# Patient Record
Sex: MALE | Race: WHITE
[De-identification: names, ages, dates, MRNs, and addresses within clinical notes are randomized per-mention and may not be internally consistent; named-entity substitution may affect disease eponyms.]

---

## 2017-01-09 NOTE — ER DOCUMENT REPORT
ED Medical Screen (RME)





- General


Chief Complaint: Flank Pain


Stated Complaint: ABDOMINAL PAIN


Time seen by provider: 21:14


Mode of Arrival: Ambulatory


Information source: Patient


Notes: 


26-year-old male complaining of right flank pain for 2 weeks that is radiating 

around to the right lower quadrant into the right testicle today at 3 PM.  No 

nausea.  History of a kidney stone.  feels like a kidney stone in the front.  

He ate dinner at 5:30 PM.

## 2017-01-09 NOTE — ER DOCUMENT REPORT
ED General





- General


Chief Complaint: Flank Pain


Stated Complaint: ABDOMINAL PAIN


Mode of Arrival: Ambulatory


Notes: 


Patient is a 26-year-old male presents with complaint of pain in his right back 

that radiates around the right flank and into the right lower abdomen.  He also 

feels some pain that shoots into his testicle.  No pain with movement or 

palpation of the testicles.  No pain or burning with urination.  Patient's had 

kidney stones in the past and says this feels very similar to previous kidney 

stones.  No vomiting.  No previous intervention to remove kidney stones.  Pain 

is actually been there for 2 weeks.  He says at work he does do a lot of heavy 

lifting and twisting.  He takes no medications.  He is otherwise healthy.  He 

does smoke.  Occasionally drinks alcohol.  No history of drug abuse.


TRAVEL OUTSIDE OF THE [place] IN LAST 30 DAYS: No





- Related Data


Allergies/Adverse Reactions: 


 





No Known Allergies Allergy (Unverified 01/09/17 23:07)


 











Past Medical History





- General


Information source: Patient





- Social History


Smoking Status: Current Every Day Smoker


Chew tobacco use (# tins/day): No


Frequency of alcohol use: Occasional


Drug Abuse: None


Family History: Reviewed & Not Pertinent


Patient has suicidal ideation: No


Patient has homicidal ideation: No


Renal/ Medical History: Reports: Hx Kidney Stones





- Immunizations


Hx Diphtheria, Pertussis, Tetanus Vaccination: Yes





Review of Systems





- Review of Systems


Notes: 


My Normal Review Basic





REVIEW OF SYSTEMS:


CONSTITUTIONAL :  Denies fever,  chills, or sweats.  Denies recent illness.


RESPIRATORY:  Denies cough, cold, or chest congestion.  Denies shortness of 

breath, difficulty breathing, or wheezing.


GASTROINTESTINAL: Right lower abdominal pain.  Denies nausea, vomiting, or 

diarrhea.  Denies constipation.  Last BM: 


GENITOURINARY:  Denies difficulty urinating, painful urination, burning, 

frequency, or blood in urine.


MUSCULOSKELETAL: Right-sided back pain


SKIN:   Denies rash or skin lesions.


NEUROLOGICAL:  Denies altered mental status or loss of consciousness.  Denies 

headache.  Denies weakness or paralysis or loss of use of either side.  Denies 

problems with gait or speech.  Denies sensory or motor loss.


ALL OTHER SYSTEMS REVIEWED AND NEGATIVE.





Physical Exam





- Vital signs


Vitals: 


 











Temp Pulse Resp BP Pulse Ox


 


 98.2 F   74   18   136/84 H  97 


 


 01/09/17 21:13  01/09/17 21:13  01/09/17 21:13  01/09/17 21:13  01/09/17 21:13














- Notes


Notes: 


General Appearance: Well nourished, alert, cooperative, no acute distress, mild 

obvious discomfort.


Vitals: reviewed, See vital signs table.


Head: no swelling or tenderness to the head


Eyes: PERRL, EOMI, Conjuctiva clear


Mouth: No decreasd moisturey


Neck: Supple, no neck tenderness, No thyromegaly


Lungs: No wheezing, No rales, No rhonci, No accessory muscle use, good air 

exchange bilaterally.


Heart: Normal rate, Regular rythm, No murmur, no rub


Abdomen: Normal BS, soft, No rigidity, mild to moderate right flank and right-

sided abdominal tenderness to palpation, No guarding, no rebound, no abdominal 

masses, no organomegaly


Genital: No swelling or redness to the testicles are genitalia.  No pain to 

palpation of the testicles.  No evidence of inguinal hernia on exam.


Back: Moderate pain palpation of the right lumbar paraspinal musculature.


Extremities: strength 5/5 in all extremities, good pulses in all extremities, 

no swelling or tenderness in the extremities, no edema.


Skin: warm, dry, appropriate color, no rash


Neuro: speech clear, oriented x 3, normal affect, responds appropriately to 

questions.





Course





- Vital Signs


Vital signs: 


 











Temp Pulse Resp BP Pulse Ox


 


 98.2 F   74   18   156/84 H  97 


 


 01/09/17 22:28  01/09/17 22:28  01/09/17 22:28  01/09/17 22:28  01/09/17 22:28














- Laboratory


Result Diagrams: 


 01/09/17 21:21





 01/09/17 21:21


Laboratory results interpreted by me: 


 











  01/09/17 01/09/17





  21:21 21:21


 


Carbon Dioxide  31 H 


 


Creatinine  1.28 H 


 


Calcium  10.4 H 


 


Urine Protein   30 H


 


Urine Ketones   TRACE H


 


Urine Ascorbic Acid   40 H














- Transfer of Care


Notes: 


01/10/17 00:05


I did recommend a CT scan to a kidney stone being that the patient's says 

painful symmetric kidney stones also has some atypical features in that it is 

reproducible palpation.  Patient initially agreed but then later refused CT 

scan saying that she does have an insurance and does not want to increase his 

medical beer.  I did review with him the possibilities.  I informed him that I 

think appendicitis unlikely.  Pain has been ongoing for 2 weeks and he has no 

rigidity or firmness to the abdomen.  His abdomen is very soft and non-

concerning on exam.  Has no leukocytosis or fevers.  I informed him that they 

could be kidney stone.  Also informed him that there is unlikely to could be 

musculoskeletal being that he does do a lot of heavy lifting with his job and 

that the pain is easily reproducible and worse over the right lumbar paraspinal 

musculature.  Also informed him that chronic ongoing back pain with radiation 

around the flank sometimes can be a sign of kidney cancer or tumor or other 

forms of cancer.  I informed him that this is less likely because there is no 

hematuria; however, if his pain continues for more than a week further he must 

return to ER or follow up with her doctor for further evaluation and imaging.  

Patient agrees with this and will be discharged home as he requests.  Patient 

encouraged to return to the ER immediately if has fevers, vomiting, worsening 

pain, or feels unwell.





Dictation of this chart was performed using voice recognition software; 

therefore, there may be some unintended grammatical errors.





01/10/17 00:07








Discharge





- Discharge


Clinical Impression: 


 Flank pain





Back pain


Qualifiers:


 Back pain location: low back pain Chronicity: acute Back pain laterality: 

right Sciatica presence: without sciatica Qualified Code(s): M54.5 - Low back 

pain





Condition: Good


Disposition: HOME, SELF-CARE


Additional Instructions: 


LOW BACK PAIN:





     Three out of every four people will have an episode of disabling back pain 

during their lifetime.  Most commonly the pain is due to straining of the 

muscles and ligaments in the low back.


     Usual treatment includes: 


(1) Rest on a firm surface.  Avoid lying on your stomach.  


(2) Ice pack the painful area.  After a few days, gentle heat may be used 

intermittently to relax the area, or ice packs can be continued.  


(3) Medication may be needed -- muscle relaxers and antiinflammatory medicines 

are commonly used.  


(4) As the back improves, exercises are prescribed to strengthen the back and 

abdominal muscles.


     Your doctor will advise you on the proper care for your back at each stage 

in your recovery.  You may be better in a few days -- or healing may take 

several weeks.


     If new symptoms of a "herniated disc" (radiation of pain, numbness, or 

tingling down the back of the leg or weakness in the leg) occur, you should be 

re-examined.  Further testing may be necessary.














ICE PACKS:


     Apply ice packs frequently against the painful area.  Many different 

schedules are recommended, such as "20 minutes on, 20 minutes off" or "one hour 

ice, two hours rest."  If you need to work, you may need to go longer between 

ice treatments.  You should plan to have the area ice packed AT LEAST one 

fourth of the time.


     The ice should be applied over the wrap, tape, or splint, or over a layer 

of cloth -- not directly against the skin.  Some ice bags have a built-in cloth 

and can be put directly on the skin.





WARM PACKS:


     After approximately two days, apply gentle heat (such as a heating pad or 

hot water bottle) for about 20 to 30 minutes about every two hours -- at least 

four times daily.  Warmth and elevation will help you make a more rapid recovery

, and will ease the pain considerably.


     Do not use HOT heat, and never apply heat for longer than 30 minutes.  The 

continuous heat can invisibly damage skin and muscles -- even when no burn is 

seen on the surface.  Damaged muscles can make you MORE sore.








FOLLOW-UP CARE:


If you have been referred to a physician for follow-up care, call the physician

s office for an appointment as you were instructed or within the next two days.

  If you experience worsening or a significant change in your symptoms, notify 

the physician immediately or return to the Emergency Department at any time for 

re-evaluation.





Please do not take Motrin or Aleve for long periods of time as this can affect 

her kidney function.  Please take Tylenol for pain control.  Please return to 

ER or see a doctor in one week for close reevaluation if you continue of pain .

  Please return to the ER immediately if you have worsening pain, recurrent 

vomiting, fevers, or feel unwell.

## 2018-05-09 ENCOUNTER — HOSPITAL ENCOUNTER (EMERGENCY)
Dept: HOSPITAL 62 - ER | Age: 28
Discharge: HOME | End: 2018-05-09
Payer: SELF-PAY

## 2018-05-09 VITALS — SYSTOLIC BLOOD PRESSURE: 107 MMHG | DIASTOLIC BLOOD PRESSURE: 64 MMHG

## 2018-05-09 DIAGNOSIS — Z87.442: ICD-10-CM

## 2018-05-09 DIAGNOSIS — R10.2: ICD-10-CM

## 2018-05-09 DIAGNOSIS — R10.11: ICD-10-CM

## 2018-05-09 DIAGNOSIS — R11.2: ICD-10-CM

## 2018-05-09 DIAGNOSIS — R74.8: Primary | ICD-10-CM

## 2018-05-09 DIAGNOSIS — R10.32: ICD-10-CM

## 2018-05-09 DIAGNOSIS — R63.4: ICD-10-CM

## 2018-05-09 DIAGNOSIS — R10.31: ICD-10-CM

## 2018-05-09 DIAGNOSIS — F17.200: ICD-10-CM

## 2018-05-09 LAB
ADD MANUAL DIFF: NO
ALBUMIN SERPL-MCNC: 4.3 G/DL (ref 3.5–5)
ALP SERPL-CCNC: 158 U/L (ref 38–126)
ALT SERPL-CCNC: 1413 U/L (ref 21–72)
ANION GAP SERPL CALC-SCNC: 13 MMOL/L (ref 5–19)
APAP SERPL-MCNC: < 10 UG/ML (ref 10–30)
APPEARANCE UR: CLEAR
APTT BLD: 31.1 SEC (ref 23.5–35.8)
APTT PPP: (no result) S
AST SERPL-CCNC: 728 U/L (ref 17–59)
BARBITURATES UR QL SCN: NEGATIVE
BASOPHILS # BLD AUTO: 0.1 10^3/UL (ref 0–0.2)
BASOPHILS NFR BLD AUTO: 0.7 % (ref 0–2)
BILIRUB DIRECT SERPL-MCNC: 2.7 MG/DL (ref 0–0.4)
BILIRUB SERPL-MCNC: 4.2 MG/DL (ref 0.2–1.3)
BILIRUB UR QL STRIP: (no result)
BUN SERPL-MCNC: 13 MG/DL (ref 7–20)
CALCIUM: 9.8 MG/DL (ref 8.4–10.2)
CHLORIDE SERPL-SCNC: 104 MMOL/L (ref 98–107)
CO2 SERPL-SCNC: 27 MMOL/L (ref 22–30)
EOSINOPHIL # BLD AUTO: 0.2 10^3/UL (ref 0–0.6)
EOSINOPHIL NFR BLD AUTO: 2.7 % (ref 0–6)
ERYTHROCYTE [DISTWIDTH] IN BLOOD BY AUTOMATED COUNT: 14.8 % (ref 11.5–14)
ETHANOL SERPL-MCNC: < 10 MG/DL
GLUCOSE SERPL-MCNC: 96 MG/DL (ref 75–110)
GLUCOSE UR STRIP-MCNC: NEGATIVE MG/DL
HCT VFR BLD CALC: 47.2 % (ref 37.9–51)
HGB BLD-MCNC: 16.1 G/DL (ref 13.5–17)
INR PPP: 1
KETONES UR STRIP-MCNC: NEGATIVE MG/DL
LIPASE SERPL-CCNC: 44.6 U/L (ref 23–300)
LYMPHOCYTES # BLD AUTO: 1.5 10^3/UL (ref 0.5–4.7)
LYMPHOCYTES NFR BLD AUTO: 19.9 % (ref 13–45)
MCH RBC QN AUTO: 31.6 PG (ref 27–33.4)
MCHC RBC AUTO-ENTMCNC: 34.2 G/DL (ref 32–36)
MCV RBC AUTO: 92 FL (ref 80–97)
METHADONE UR QL SCN: NEGATIVE
MONOCYTES # BLD AUTO: 0.8 10^3/UL (ref 0.1–1.4)
MONOCYTES NFR BLD AUTO: 10.9 % (ref 3–13)
NEUTROPHILS # BLD AUTO: 4.8 10^3/UL (ref 1.7–8.2)
NEUTS SEG NFR BLD AUTO: 65.8 % (ref 42–78)
NITRITE UR QL STRIP: NEGATIVE
PCP UR QL SCN: NEGATIVE
PH UR STRIP: 5 [PH] (ref 5–9)
PLATELET # BLD: 330 10^3/UL (ref 150–450)
POTASSIUM SERPL-SCNC: 5 MMOL/L (ref 3.6–5)
PROT SERPL-MCNC: 7.3 G/DL (ref 6.3–8.2)
PROT UR STRIP-MCNC: 100 MG/DL
PROTHROMBIN TIME: 13.7 SEC (ref 11.4–15.4)
RBC # BLD AUTO: 5.12 10^6/UL (ref 4.35–5.55)
SALICYLATES SERPL-MCNC: < 1 MG/DL (ref 2–20)
SODIUM SERPL-SCNC: 144.4 MMOL/L (ref 137–145)
SP GR UR STRIP: 1.03
TOTAL CELLS COUNTED % (AUTO): 100 %
URINE BENZODIAZEPINES SCREEN: NEGATIVE
URINE COCAINE SCREEN: NEGATIVE
URINE MARIJUANA (THC) SCREEN: (no result)
UROBILINOGEN UR-MCNC: 4 MG/DL (ref ?–2)
WBC # BLD AUTO: 7.3 10^3/UL (ref 4–10.5)

## 2018-05-09 PROCEDURE — 85730 THROMBOPLASTIN TIME PARTIAL: CPT

## 2018-05-09 PROCEDURE — 85610 PROTHROMBIN TIME: CPT

## 2018-05-09 PROCEDURE — 80307 DRUG TEST PRSMV CHEM ANLYZR: CPT

## 2018-05-09 PROCEDURE — 85025 COMPLETE CBC W/AUTO DIFF WBC: CPT

## 2018-05-09 PROCEDURE — 81001 URINALYSIS AUTO W/SCOPE: CPT

## 2018-05-09 PROCEDURE — 80053 COMPREHEN METABOLIC PANEL: CPT

## 2018-05-09 PROCEDURE — 76705 ECHO EXAM OF ABDOMEN: CPT

## 2018-05-09 PROCEDURE — 83690 ASSAY OF LIPASE: CPT

## 2018-05-09 PROCEDURE — 36415 COLL VENOUS BLD VENIPUNCTURE: CPT

## 2018-05-09 PROCEDURE — 80074 ACUTE HEPATITIS PANEL: CPT

## 2018-05-09 PROCEDURE — 96372 THER/PROPH/DIAG INJ SC/IM: CPT

## 2018-05-09 PROCEDURE — 99284 EMERGENCY DEPT VISIT MOD MDM: CPT

## 2018-05-09 NOTE — RADIOLOGY REPORT (SQ)
EXAM DESCRIPTION:  U/S ABDOMEN LIMITED W/O DOP



COMPLETED DATE/TIME:  5/9/2018 5:04 pm



REASON FOR STUDY:  ruq pain, elevated bili and liver enzymes/ n/v



COMPARISON:  None.



TECHNIQUE:  Dynamic and static grayscale images acquired of the abdomen and recorded on PACS. Additio
nal selected color Doppler and spectral images recorded.



LIMITATIONS:  None.



FINDINGS:  PANCREAS: No masses.  Visualized pancreatic duct normal caliber.

LIVER: No masses. Echotexture normal.

LIVER VASCULATURE: Normal directional flow of the main portal vein.

GALLBLADDER: No stones. Normal wall thickness. No pericholecystic fluid.

ULTRASOUND-DETECTED OCAMPO'S SIGN: Negative.

INTRAHEPATIC DUCTS AND COMMON DUCT: CBD and intrahepatic ducts normal caliber. No filling defects.

INFERIOR VENA CAVA: Normal flow.

AORTA: No aneurysm.

RIGHT KIDNEY:  Normal size. Normal echogenicity. No solid or suspicious masses. No hydronephrosis. No
 calcifications.

PERITONEAL AND RIGHT PLEURAL SPACE: No ascites or effusions.

OTHER: No other significant findings.



IMPRESSION:  No significant intra-abdominal abnormalities were identified.  Findings as noted above



TECHNICAL DOCUMENTATION:  JOB ID:  [id number]

 2011 [employer]- All Rights Reserved



Reading location - IP/workstation name: [email or web address]

## 2018-05-09 NOTE — ER DOCUMENT REPORT
ED General





- General


Chief Complaint: Abnormal Lab Results


Stated Complaint: ABNORMAL LABS


Time Seen by Provider: 05/09/18 12:17


Mode of Arrival: Ambulatory


Information source: Patient


Notes: 





Pt is a 27 year old male who presents to the ER today for ruq abominal pain and 

all over lower abdominal pain with nausea and vomiting x 2 weeks. Pt was seen 

at [facility] yesterday and told he had elevated liver enzymes. He states the 

ultrasound was normal. He was going to be admitted but states he didn't want to 

be and left against medical advice. He denies fever/chills. He admits to a 20lb 

weight loss over the 2 weeks. He has had no traveling, no tattoos or need sticks

, no risky behavior. He denies taking tylenol. 


TRAVEL OUTSIDE OF THE [place] IN LAST 30 DAYS: No





- Related Data


Allergies/Adverse Reactions: 


 





No Known Allergies Allergy (Verified 05/09/18 12:09)


 











Past Medical History





- General


Information source: Patient





- Social History


Smoking Status: Current Every Day Smoker


Chew tobacco use (# tins/day): No


Frequency of alcohol use: Heavy


Drug Abuse: None


Family History: Reviewed & Not Pertinent


Patient has suicidal ideation: No


Patient has homicidal ideation: No


Renal/ Medical History: Reports: Hx Kidney Stones.  Denies: Hx Peritoneal 

Dialysis





- Immunizations


Hx Diphtheria, Pertussis, Tetanus Vaccination: Yes





Review of Systems





- Review of Systems


Constitutional: No symptoms reported


EENT: No symptoms reported


Cardiovascular: No symptoms reported


Respiratory: No symptoms reported


Gastrointestinal: See HPI


Genitourinary: No symptoms reported


Male Genitourinary: No symptoms reported


Musculoskeletal: No symptoms reported


Skin: No symptoms reported


Hematologic/Lymphatic: No symptoms reported


Neurological/Psychological: No symptoms reported





Physical Exam





- Vital signs


Vitals: 


 











Temp Pulse Resp BP Pulse Ox


 


 98.2 F   80   16   134/77 H  99 


 


 05/09/18 12:12  05/09/18 12:12  05/09/18 12:12  05/09/18 12:12  05/09/18 12:12














- Notes


Notes: 





PHYSICAL EXAMINATION: 


GENERAL: uncomfortable appearing, but in no acute distress. 


HEAD: Atraumatic, normocephalic. 


EYES: pupils equal round and reactive to light, extraocular movements intact, 

sclera anicteric, conjunctiva are normal. 


ENT: ear canals without erythema or foreign body, TMs pearly grey with good 

bony landmarks, nares patent, oropharynx clear without exudates. Moist mucous 

membranes.  Airway patent


NECK: Normal range of motion, supple without lymphadenopathy 


LUNGS: CTAB and equal. No wheezes rales or rhonchi. 


HEART: Regular rate and rhythm without murmurs


ABDOMEN:  soft, RUQ tenderness, no rebound or guarding 


GI/: no CVA tenderness


EXTREMITIES: Normal range of motion, no pitting edema. No cyanosis.  


NEUROLOGICAL: Cranial nerves grossly intact. Normal sensory/motor exams.  Good 

and equal strength bilaterally


PSYCH: Normal mood, normal affect. 


SKIN: Warm, Dry, normal turgor, no rashes or lesions noted





Course





- Re-evaluation


Re-evalutation: 





05/09/18 22:57


pt positive for marijuana on drug screen, elevated liver enzymes and bilirubin. 

RUQ us negative for any acute pathology. Pt has not vomited here. I gave him 

Dr. Araujo's information, GI on call to follow up with outpatient, WBC normal, 

pt looks well clinically, normal vitals, afebrile, hepatitis panel pending. 





- Vital Signs


Vital signs: 


 











Temp Pulse Resp BP Pulse Ox


 


 98.3 F   81   18   107/64   99 


 


 05/09/18 17:56  05/09/18 17:56  05/09/18 17:56  05/09/18 17:56  05/09/18 17:56














- Laboratory


Result Diagrams: 


 05/09/18 12:35





 05/09/18 12:35


Laboratory results interpreted by me: 


 











  05/09/18 05/09/18 05/09/18





  12:35 12:35 12:35


 


RDW  14.8 H  


 


Total Bilirubin   4.2 H 


 


Direct Bilirubin   2.7 H 


 


AST   728 H 


 


ALT   1413 H 


 


Alkaline Phosphatase   158 H 


 


Urine Protein    100 H


 


Urine Bilirubin    MODERATE H


 


Urine Urobilinogen    4.0 H


 


Urine Ascorbic Acid    40 H


 


Salicylates   < 1.0 L 


 


Acetaminophen   < 10 L 














Discharge





- Discharge


Clinical Impression: 


 Elevated liver enzymes





Nausea and vomiting


Qualifiers:


 Vomiting type: unspecified Vomiting Intractability: non-intractable Qualified 

Code(s): R11.2 - Nausea with vomiting, unspecified





Condition: Stable


Disposition: HOME, SELF-CARE


Instructions:  Hepatitis ([facility])


Prescriptions: 


Oxycodone HCl [Oxycodone HCl 10 MG Tablet] 1 tab PO Q6H PRN #15 tablet


 PRN Reason: PAIN


Promethazine HCl [Phenergan 25 mg Tablet] 1 - 2 tab PO Q6H PRN #15 tablet


 PRN Reason: 


Referrals: 


ITZEL ARAUJO MD [ACTIVE STAFF] - Follow up as needed

## 2018-05-09 NOTE — ER DOCUMENT REPORT
ED Medical Screen (RME)





- General


Chief Complaint: Abnormal Lab Results


Stated Complaint: ABNORMAL LABS


Time Seen by Provider: 05/09/18 12:17


TRAVEL OUTSIDE OF THE [place] IN LAST 30 DAYS: No





- HPI


Notes: 





05/09/18 12:23


Patient drinks daily had elevation of liver function tests was seen at [facility] recommend admission left AGAINST MEDICAL ADVICE had a phone call today 

for follow-up states that he was told he has hepatitis and return to ER 

therefore he came to [facility]





- Related Data


Allergies/Adverse Reactions: 


 





No Known Allergies Allergy (Verified 05/09/18 12:09)


 











Past Medical History





- Social History


Chew tobacco use (# tins/day): No


Frequency of alcohol use: Heavy


Drug Abuse: None


Renal/ Medical History: Reports: Hx Kidney Stones.  Denies: Hx Peritoneal 

Dialysis





- Immunizations


Hx Diphtheria, Pertussis, Tetanus Vaccination: Yes





Review of Systems





- Review of Systems


Constitutional: Other - Liver function abnormality





Physical Exam





- Vital signs


Vitals: 





 











Temp Pulse Resp BP Pulse Ox


 


 98.2 F   80   16   134/77 H  99 


 


 05/09/18 12:12  05/09/18 12:12  05/09/18 12:12  05/09/18 12:12  05/09/18 12:12











Interpretation: Normal





- General


General appearance: Appears well, Alert





- HEENT


Head: Normocephalic, Atraumatic


Eyes: Normal


Pupils: PERRL





- Respiratory


Respiratory status: No respiratory distress


Chest status: Nontender


Breath sounds: Normal


Chest palpation: Normal





- Cardiovascular


Rhythm: Regular


Heart sounds: Normal auscultation


Murmur: No





- Abdominal


Inspection: Normal


Distension: No distension


Bowel sounds: Normal


Tenderness: Nontender


Organomegaly: No organomegaly





- Back


Back: Normal, Nontender





- Extremities


General upper extremity: Normal inspection, Nontender, Normal color, Normal ROM

, Normal temperature


General lower extremity: Normal inspection, Nontender, Normal color, Normal ROM

, Normal temperature, Normal weight bearing.  No: Olya's sign





- Neurological


Neuro grossly intact: Yes


Cognition: Normal


Orientation: AAOx4


[place] Coma Scale Eye Opening: Spontaneous


[place] Coma Scale Verbal: Oriented


[place] Coma Scale Motor: Obeys Commands


[place] Coma Scale Total: 15


Speech: Normal


Motor strength normal: LUE, RUE, LLE, RLE


Sensory: Normal





- Psychological


Associated symptoms: Normal affect, Normal mood





- Skin


Skin Temperature: Warm


Skin Moisture: Dry


Skin Color: Normal





Course





- Vital Signs


Vital signs: 





 











Temp Pulse Resp BP Pulse Ox


 


 98.2 F   80   16   134/77 H  99 


 


 05/09/18 12:12  05/09/18 12:12  05/09/18 12:12  05/09/18 12:12  05/09/18 12:12

## 2018-05-11 LAB — HEPATITIS C VIRUS ANTIBODY: >11 S/CO RATIO (ref 0–0.9)

## 2018-05-17 ENCOUNTER — HOSPITAL ENCOUNTER (EMERGENCY)
Dept: HOSPITAL 62 - ER | Age: 28
Discharge: HOME | End: 2018-05-17
Payer: SELF-PAY

## 2018-05-17 VITALS — DIASTOLIC BLOOD PRESSURE: 75 MMHG | SYSTOLIC BLOOD PRESSURE: 132 MMHG

## 2018-05-17 DIAGNOSIS — Z87.891: ICD-10-CM

## 2018-05-17 DIAGNOSIS — Z87.442: ICD-10-CM

## 2018-05-17 DIAGNOSIS — M54.9: ICD-10-CM

## 2018-05-17 DIAGNOSIS — R10.9: ICD-10-CM

## 2018-05-17 DIAGNOSIS — B19.20: Primary | ICD-10-CM

## 2018-05-17 DIAGNOSIS — F12.10: ICD-10-CM

## 2018-05-17 DIAGNOSIS — R63.0: ICD-10-CM

## 2018-05-17 DIAGNOSIS — R10.11: ICD-10-CM

## 2018-05-17 DIAGNOSIS — R63.4: ICD-10-CM

## 2018-05-17 LAB
ADD MANUAL DIFF: NO
ALBUMIN SERPL-MCNC: 5 G/DL (ref 3.5–5)
ALP SERPL-CCNC: 160 U/L (ref 38–126)
ALT SERPL-CCNC: 772 U/L (ref 21–72)
ANION GAP SERPL CALC-SCNC: 14 MMOL/L (ref 5–19)
APPEARANCE UR: (no result)
APTT PPP: YELLOW S
AST SERPL-CCNC: 262 U/L (ref 17–59)
BARBITURATES UR QL SCN: NEGATIVE
BASOPHILS # BLD AUTO: 0 10^3/UL (ref 0–0.2)
BASOPHILS NFR BLD AUTO: 0.4 % (ref 0–2)
BILIRUB DIRECT SERPL-MCNC: 2.1 MG/DL (ref 0–0.4)
BILIRUB SERPL-MCNC: 3.4 MG/DL (ref 0.2–1.3)
BILIRUB UR QL STRIP: NEGATIVE
BUN SERPL-MCNC: 13 MG/DL (ref 7–20)
CALCIUM: 10.3 MG/DL (ref 8.4–10.2)
CHLAM PCR: NOT DETECTED
CHLORIDE SERPL-SCNC: 98 MMOL/L (ref 98–107)
CO2 SERPL-SCNC: 31 MMOL/L (ref 22–30)
EOSINOPHIL # BLD AUTO: 0.1 10^3/UL (ref 0–0.6)
EOSINOPHIL NFR BLD AUTO: 0.5 % (ref 0–6)
ERYTHROCYTE [DISTWIDTH] IN BLOOD BY AUTOMATED COUNT: 15.4 % (ref 11.5–14)
ETHANOL SERPL-MCNC: < 10 MG/DL
GLUCOSE SERPL-MCNC: 101 MG/DL (ref 75–110)
GLUCOSE UR STRIP-MCNC: NEGATIVE MG/DL
GON PCR: NOT DETECTED
HCT VFR BLD CALC: 47.1 % (ref 37.9–51)
HGB BLD-MCNC: 16.2 G/DL (ref 13.5–17)
KETONES UR STRIP-MCNC: NEGATIVE MG/DL
LIPASE SERPL-CCNC: 64.9 U/L (ref 23–300)
LYMPHOCYTES # BLD AUTO: 2.3 10^3/UL (ref 0.5–4.7)
LYMPHOCYTES NFR BLD AUTO: 22.7 % (ref 13–45)
MCH RBC QN AUTO: 31.6 PG (ref 27–33.4)
MCHC RBC AUTO-ENTMCNC: 34.4 G/DL (ref 32–36)
MCV RBC AUTO: 92 FL (ref 80–97)
METHADONE UR QL SCN: NEGATIVE
MONOCYTES # BLD AUTO: 1 10^3/UL (ref 0.1–1.4)
MONOCYTES NFR BLD AUTO: 9.5 % (ref 3–13)
NEUTROPHILS # BLD AUTO: 6.8 10^3/UL (ref 1.7–8.2)
NEUTS SEG NFR BLD AUTO: 66.9 % (ref 42–78)
NITRITE UR QL STRIP: NEGATIVE
PCP UR QL SCN: NEGATIVE
PH UR STRIP: 7 [PH] (ref 5–9)
PLATELET # BLD: 397 10^3/UL (ref 150–450)
POTASSIUM SERPL-SCNC: 4.6 MMOL/L (ref 3.6–5)
PROT SERPL-MCNC: 8.3 G/DL (ref 6.3–8.2)
PROT UR STRIP-MCNC: NEGATIVE MG/DL
RBC # BLD AUTO: 5.14 10^6/UL (ref 4.35–5.55)
SODIUM SERPL-SCNC: 142.8 MMOL/L (ref 137–145)
SP GR UR STRIP: 1.02
TOTAL CELLS COUNTED % (AUTO): 100 %
URINE BENZODIAZEPINES SCREEN: NEGATIVE
URINE COCAINE SCREEN: NEGATIVE
URINE MARIJUANA (THC) SCREEN: (no result)
UROBILINOGEN UR-MCNC: 2 MG/DL (ref ?–2)
WBC # BLD AUTO: 10.1 10^3/UL (ref 4–10.5)

## 2018-05-17 PROCEDURE — 96374 THER/PROPH/DIAG INJ IV PUSH: CPT

## 2018-05-17 PROCEDURE — 96361 HYDRATE IV INFUSION ADD-ON: CPT

## 2018-05-17 PROCEDURE — 93005 ELECTROCARDIOGRAM TRACING: CPT

## 2018-05-17 PROCEDURE — 80053 COMPREHEN METABOLIC PANEL: CPT

## 2018-05-17 PROCEDURE — 74160 CT ABDOMEN W/CONTRAST: CPT

## 2018-05-17 PROCEDURE — 85025 COMPLETE CBC W/AUTO DIFF WBC: CPT

## 2018-05-17 PROCEDURE — G0480 DRUG TEST DEF 1-7 CLASSES: HCPCS

## 2018-05-17 PROCEDURE — 86701 HIV-1ANTIBODY: CPT

## 2018-05-17 PROCEDURE — 36415 COLL VENOUS BLD VENIPUNCTURE: CPT

## 2018-05-17 PROCEDURE — 80307 DRUG TEST PRSMV CHEM ANLYZR: CPT

## 2018-05-17 PROCEDURE — 99285 EMERGENCY DEPT VISIT HI MDM: CPT

## 2018-05-17 PROCEDURE — 96375 TX/PRO/DX INJ NEW DRUG ADDON: CPT

## 2018-05-17 PROCEDURE — 87591 N.GONORRHOEAE DNA AMP PROB: CPT

## 2018-05-17 PROCEDURE — 87491 CHLMYD TRACH DNA AMP PROBE: CPT

## 2018-05-17 PROCEDURE — 71046 X-RAY EXAM CHEST 2 VIEWS: CPT

## 2018-05-17 PROCEDURE — 93010 ELECTROCARDIOGRAM REPORT: CPT

## 2018-05-17 PROCEDURE — 81001 URINALYSIS AUTO W/SCOPE: CPT

## 2018-05-17 PROCEDURE — 83690 ASSAY OF LIPASE: CPT

## 2018-05-17 RX ADMIN — SODIUM CHLORIDE PRN ML: 9 INJECTION, SOLUTION INTRAVENOUS at 15:18

## 2018-05-17 RX ADMIN — SODIUM CHLORIDE PRN ML: 9 INJECTION, SOLUTION INTRAVENOUS at 16:01
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
## 2018-05-17 NOTE — RADIOLOGY REPORT (SQ)
EXAM DESCRIPTION:  CHEST 2 VIEWS



COMPLETED DATE/TIME:  5/17/2018 3:33 pm



REASON FOR STUDY:  tachycardia, epigastric pain, abd pain, smoker



COMPARISON:  None.



EXAM PARAMETERS:  NUMBER OF VIEWS: two views

TECHNIQUE: Digital Frontal and Lateral radiographic views of the chest acquired.

RADIATION DOSE: NA

LIMITATIONS: none



FINDINGS:  LUNGS AND PLEURA: No opacities, masses or pneumothorax. No pleural effusion.

MEDIASTINUM AND HILAR STRUCTURES: No masses or contour abnormalities.

HEART AND VASCULAR STRUCTURES: Heart normal size.  No evidence for failure.

BONES: No acute findings.

HARDWARE: None in the chest.

OTHER: No other significant finding.



IMPRESSION:  NO ACUTE RADIOGRAPHIC FINDING IN THE CHEST.



TECHNICAL DOCUMENTATION:  JOB ID:  [id number]

 2011 [employer]- All Rights Reserved



Reading location - IP/workstation name: [email or web address]

## 2018-05-17 NOTE — ER DOCUMENT REPORT
ED Medical Screen (RME)





- General


Chief Complaint: Abdominal Pain


Stated Complaint: ABDOMINAL PAIN


Time Seen by Provider: 05/17/18 14:38


Mode of Arrival: Ambulatory


Information source: Patient


TRAVEL OUTSIDE OF THE [place] IN LAST 30 DAYS: No





- HPI


Notes: 





05/17/18 14:50


19-year-old male who recently found out 2 weeks ago that he has hepatitis C 

presents to the emergency room for complaints of abdominal pain, nausea, 

vomiting, decreased appetite, bilateral kidney pain for the last 4 weeks.  

Patient was seen and advised to follow-up with the primary care provider , 

patient has not done so.  Patient states he has lost a significant amount of 

weight approximately 25 pounds.  Patient has not tried any over-the-counter 

medication.  Patient was prescribed Zofran as last visit, states this helped 

but once he ran out of medication he was unable to eat, had nausea and 

vomiting.  Patient reports he has had unprotected sexual intercourse with 

partners in the past. denies any IV drug use in the past.  Denies any fevers or 

chills.  Patient does smoke cigarettes.





I have greeted and performed a rapid initial assessment of this patient.  A 

comprehensive ED assessment and evaluation of the patient, analysis of test 

results and completion of medical decision making process will be conducted by 

an additional ED providers.





- Related Data


Allergies/Adverse Reactions: 


 





No Known Allergies Allergy (Verified 05/17/18 13:06)


 











Past Medical History





- Social History


Chew tobacco use (# tins/day): No


Frequency of alcohol use: None


Drug Abuse: None


Renal/ Medical History: Reports: Hx Kidney Stones.  Denies: Hx Peritoneal 

Dialysis





- Immunizations


Hx Diphtheria, Pertussis, Tetanus Vaccination: Yes





Physical Exam





- Vital signs


Vitals: 





 











Temp Pulse Resp BP Pulse Ox


 


 98.1 F   112 H  16   148/89 H  100 


 


 05/17/18 13:08  05/17/18 13:08  05/17/18 13:08  05/17/18 13:08  05/17/18 13:08














- Respiratory


Respiratory status: No respiratory distress


Chest status: Nontender


Breath sounds: Normal


Chest palpation: Normal





- Cardiovascular


Rhythm: Tachycardia


Heart sounds: Normal auscultation


Normal capillary refill: Yes





- Abdominal


Inspection: Normal


Distension: No distension


Tenderness: Tender - generalized. right cva tenderness on palpation.





Course





- Vital Signs


Vital signs: 





 











Temp Pulse Resp BP Pulse Ox


 


 98.1 F   112 H  16   148/89 H  100 


 


 05/17/18 13:08  05/17/18 13:08  05/17/18 13:08  05/17/18 13:08  05/17/18 13:08

## 2018-05-17 NOTE — EKG REPORT
SEVERITY:- ABNORMAL ECG -

SINUS RHYTHM

PROBABLE LEFT VENTRICULAR HYPERTROPHY

:

Confirmed by: Angela Caruso 17-May-2018 19:24:23

## 2018-05-17 NOTE — RADIOLOGY REPORT (SQ)
EXAM DESCRIPTION:  CT ABDOMEN WITH IV   ORAL CONT



COMPLETED DATE/TIME:  5/17/2018 6:28 pm



REASON FOR STUDY:  n/v, abd pain, weight loss, dec yadi



COMPARISON:  None.



TECHNIQUE:  CT scan of the abdomen performed with intravenous and with oral contrast using helical sc
anning technique with dynamic intravenous contrast injection. Images reviewed with lung, soft tissue,
 and bone windows. Reconstructed coronal and sagittal MPR images reviewed. Delayed images for evaluat
ion of the urinary system also acquired and evaluated.

All images stored on PACS. All CT scanners at this facility use dose modulation, iterative reconstruc
tion, and/or weight based dosing when appropriate to reduce radiation dose to as low as reasonably ac
hievable (ALARA).

[facility]: Dose Right  [facility]: CareDose  [facility]: Dose Right    [facility]: Teradose 4D    [facility]: [employer] Technologies



CONTRAST TYPE AND DOSE:  contrast/concentration: Isovue 370.00 mg/ml; Total Contrast Delivered: 76.0 
ml; Total Saline Delivered: 67.0 ml



RENAL FUNCTION:  None required. The patient is less than 50 years old.



RADIATION DOSE:  CT Rad equipment meets quality standard of care and radiation dose reduction techniq
ues were employed. CTDIvol: 4.9 - 6.0 mGy. DLP: 564 mGy-cm. .



LIMITATIONS:  None.



FINDINGS:  LOWER CHEST: No significant findings. No nodules or infiltrates.

LIVER: Normal size. No masses.  No dilated ducts.

SPLEEN: Normal size. No focal lesions.

PANCREAS: No masses. No significant calcifications. No adjacent inflammation or peripancreatic fluid 
collections. Pancreatic duct not dilated.

GALLBLADDER: No identified stones by CT criteria. No inflammatory changes to suggest cholecystitis.

ADRENAL GLANDS: No significant masses or asymmetry.

RIGHT KIDNEY AND URETER: No solid masses.   No significant calcifications.   No hydronephrosis or hyd
roureter.

LEFT KIDNEY AND URETER: No solid masses.   No significant calcifications.   No hydronephrosis or hydr
oureter.

AORTA AND VESSELS: No aneurysm. No dissection. Renal arteries, SMA, celiac without stenosis.

RETROPERITONEUM: No retroperitoneal adenopathy, hemorrhage or masses.

BOWEL AND PERITONEAL CAVITY: No masses or inflammatory changes. No free fluid or peritoneal masses.

APPENDIX: Normal.

ABDOMINAL WALL: No masses. No hernias.

BONES: No significant or acute findings.

OTHER: No other significant finding.



IMPRESSION:  NORMAL CT OF THE ABDOMEN WITH INTRAVENOUS CONTRAST.



TECHNICAL DOCUMENTATION:  JOB ID:  [id number]

Quality ID # [id number]: Final reports with documentation of one or more dose reduction techniques (e.g., Au
tomated exposure control, adjustment of the mA and/or kV according to patient size, use of iterative 
reconstruction technique)

 2011 [employer]- All Rights Reserved



Reading location - IP/workstation name: [email or web address]

## 2018-11-01 ENCOUNTER — HOSPITAL ENCOUNTER (EMERGENCY)
Dept: HOSPITAL 62 - ER | Age: 28
Discharge: HOME | End: 2018-11-01
Payer: COMMERCIAL

## 2018-11-01 VITALS — DIASTOLIC BLOOD PRESSURE: 71 MMHG | SYSTOLIC BLOOD PRESSURE: 123 MMHG

## 2018-11-01 DIAGNOSIS — S09.93XA: Primary | ICD-10-CM

## 2018-11-01 DIAGNOSIS — Z86.19: ICD-10-CM
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
## 2018-11-01 DIAGNOSIS — S01.112A: ICD-10-CM
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
## 2018-11-01 DIAGNOSIS — S69.91XA: ICD-10-CM

## 2018-11-01 DIAGNOSIS — Z87.442: ICD-10-CM

## 2018-11-01 DIAGNOSIS — F17.210: ICD-10-CM

## 2018-11-01 PROCEDURE — 70486 CT MAXILLOFACIAL W/O DYE: CPT

## 2018-11-01 PROCEDURE — 0HQ1XZZ REPAIR FACE SKIN, EXTERNAL APPROACH: ICD-10-PCS | Performed by: NURSE PRACTITIONER

## 2018-11-01 PROCEDURE — 99284 EMERGENCY DEPT VISIT MOD MDM: CPT
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
